# Patient Record
Sex: FEMALE | Race: WHITE | Employment: FULL TIME | URBAN - METROPOLITAN AREA
[De-identification: names, ages, dates, MRNs, and addresses within clinical notes are randomized per-mention and may not be internally consistent; named-entity substitution may affect disease eponyms.]

---

## 2017-02-21 ENCOUNTER — OFFICE VISIT (OUTPATIENT)
Dept: NEUROLOGY | Facility: CLINIC | Age: 57
End: 2017-02-21

## 2017-02-21 VITALS
RESPIRATION RATE: 18 BRPM | SYSTOLIC BLOOD PRESSURE: 142 MMHG | HEART RATE: 68 BPM | BODY MASS INDEX: 31 KG/M2 | DIASTOLIC BLOOD PRESSURE: 74 MMHG | WEIGHT: 182 LBS

## 2017-02-21 DIAGNOSIS — G44.039 EPISODIC PAROXYSMAL HEMICRANIA, NOT INTRACTABLE: Primary | ICD-10-CM

## 2017-02-21 DIAGNOSIS — G43.009 MIGRAINE WITHOUT AURA AND WITHOUT STATUS MIGRAINOSUS, NOT INTRACTABLE: ICD-10-CM

## 2017-02-21 DIAGNOSIS — Q28.3 CEREBRAL CAVERNOMA: ICD-10-CM

## 2017-02-21 PROCEDURE — 99204 OFFICE O/P NEW MOD 45 MIN: CPT | Performed by: OTHER

## 2017-02-21 RX ORDER — NADOLOL 20 MG/1
20 TABLET ORAL DAILY
Qty: 30 TABLET | Refills: 5 | Status: SHIPPED | OUTPATIENT
Start: 2017-02-21 | End: 2018-01-08

## 2017-02-21 RX ORDER — NARATRIPTAN 2.5 MG/1
2.5 TABLET ORAL AS NEEDED
Qty: 10 TABLET | Refills: 5 | Status: SHIPPED | OUTPATIENT
Start: 2017-02-21 | End: 2018-02-21

## 2017-02-21 NOTE — PROGRESS NOTES
[id number] [facility] with [facility]  2/21/2017    1:04 PM      Cc:  Abnormal MRI    HPI:   She was seen by a Neurologist in 2014.   She was having lightheadedness and a scan (CT) showed a malformation but not ca •  estradiol (CLIMARA) 0.05 MG/24HR Transdermal Patch Weekly, Place 1 patch onto the skin every 7 days. , Disp: 12 patch, Rfl: 4  •  Levothyroxine Sodium (SYNTHROID, LEVOTHROID) 88 MCG Oral Tab, Take 88 mcg by mouth before breakfast., Disp: , Rfl:   •  Prob Cerebellar, coordination and gait were normal      IMPRESSION  Episodic paroxysmal hemicrania, not intractable  (primary encounter diagnosis)  Migraine without aura and without status migrainosus, not intractable  Cerebral cavernoma      Nadolol 20 mg luis

## 2017-02-21 NOTE — PATIENT INSTRUCTIONS
Refill policies:    • Allow 2 business days for refills; controlled substances may take longer.   • Contact your pharmacy at least 5 days prior to running out of medication and have them send an electronic request or submit request through the “request re your physician has recommended that you have a procedure or additional testing performed. [facility] will contact your insurance carrier to obtain pre-certification or prior authorization.     Unfortunately, MARIEL has seen an increas

## 2017-02-22 ENCOUNTER — TELEPHONE (OUTPATIENT)
Dept: NEUROLOGY | Facility: CLINIC | Age: 57
End: 2017-02-22

## 2017-02-22 NOTE — TELEPHONE ENCOUNTER
Called [facility] spoke with Pratibha Campos no authorization is needed for MRI Brain [id number], call reference #[id number].  Time on call 4:53    Called patient gave her above information including centralized scheduling  phone #

## 2017-03-03 ENCOUNTER — HOSPITAL ENCOUNTER (OUTPATIENT)
Dept: MRI IMAGING | Facility: HOSPITAL | Age: 57
Discharge: HOME | End: 2017-03-03
Attending: Other
Payer: COMMERCIAL

## 2017-03-03 DIAGNOSIS — Q28.3 CEREBRAL CAVERNOMA: ICD-10-CM

## 2017-03-03 DIAGNOSIS — G43.009 MIGRAINE WITHOUT AURA AND WITHOUT STATUS MIGRAINOSUS, NOT INTRACTABLE: ICD-10-CM

## 2017-03-03 DIAGNOSIS — G44.039 EPISODIC PAROXYSMAL HEMICRANIA, NOT INTRACTABLE: ICD-10-CM

## 2017-03-03 PROCEDURE — A9575 INJ GADOTERATE MEGLUMI 0.1ML: HCPCS | Performed by: OTHER

## 2017-03-03 PROCEDURE — 70553 MRI BRAIN STEM W/O & W/DYE: CPT

## 2017-03-08 ENCOUNTER — TELEPHONE (OUTPATIENT)
Dept: NEUROLOGY | Facility: CLINIC | Age: 57
End: 2017-03-08

## 2017-03-08 NOTE — TELEPHONE ENCOUNTER
Called with result,  Vascular variation noted left thalamus prob incidental will talk to her about it next visit

## 2017-06-01 PROBLEM — I10 ESSENTIAL HYPERTENSION: Status: ACTIVE | Noted: 2017-06-01

## 2018-02-13 ENCOUNTER — HOSPITAL ENCOUNTER (OUTPATIENT)
Age: 58
Discharge: HOME | End: 2018-02-13
Payer: COMMERCIAL

## 2018-02-13 VITALS
OXYGEN SATURATION: 99 % | HEART RATE: 99 BPM | RESPIRATION RATE: 20 BRPM | DIASTOLIC BLOOD PRESSURE: 95 MMHG | TEMPERATURE: 99 F | SYSTOLIC BLOOD PRESSURE: 148 MMHG

## 2018-02-13 DIAGNOSIS — J01.00 ACUTE NON-RECURRENT MAXILLARY SINUSITIS: Primary | ICD-10-CM

## 2018-02-13 PROCEDURE — 99213 OFFICE O/P EST LOW 20 MIN: CPT

## 2018-02-13 PROCEDURE — 99203 OFFICE O/P NEW LOW 30 MIN: CPT

## 2018-02-13 RX ORDER — AMOXICILLIN AND CLAVULANATE POTASSIUM 875; 125 MG/1; MG/1
1 TABLET, FILM COATED ORAL 2 TIMES DAILY
Qty: 20 TABLET | Refills: 0 | Status: SHIPPED | OUTPATIENT
Start: 2018-02-13 | End: 2018-02-23

## 2018-02-13 NOTE — ED PROVIDER NOTES
[place] Seen [place] [address] [facility] [place]    History   Patient presents with:  Cough/URI    Stated Complaint: sinus pain, ear pain, congestion x 2 weeks    HPI    Simone Anderson is a 49-year-old female who presents today for evaluation of sinus pain, ear Triage Vitals [02/13/18 1611]  BP: 148/95  Pulse: 99  Resp: 20  Temp: 98.7 °F (37.1 °C)  Temp src: n/a  SpO2: 99 %  O2 Device: n/a    Current:/95   Pulse 99   Temp 98.7 °F (37.1 °C)   Resp 20   SpO2 99%     Physical Exam   Constitutional: She is orie upon discharge. I discuss the plan of care with the patient, who expresses understanding. All questions and concerns are addressed to the patient's satisfaction prior to discharge today.   Disposition and Plan     Clinical Impression:  Acute non-recurrent

## 2018-05-07 PROBLEM — K63.5 POLYP OF COLON, UNSPECIFIED PART OF COLON, UNSPECIFIED TYPE: Status: ACTIVE | Noted: 2018-05-07

## 2018-11-08 ENCOUNTER — LAB ENCOUNTER (OUTPATIENT)
Dept: LAB | Facility: HOSPITAL | Age: 58
End: 2018-11-08
Attending: FAMILY MEDICINE
Payer: COMMERCIAL

## 2018-11-08 DIAGNOSIS — R19.02 ABDOMINAL MASS, LEFT UPPER QUADRANT: Primary | ICD-10-CM

## 2018-11-08 PROCEDURE — 83690 ASSAY OF LIPASE: CPT

## 2018-11-08 PROCEDURE — 85025 COMPLETE CBC W/AUTO DIFF WBC: CPT

## 2018-11-08 PROCEDURE — 80048 BASIC METABOLIC PNL TOTAL CA: CPT

## 2018-11-08 PROCEDURE — 36415 COLL VENOUS BLD VENIPUNCTURE: CPT

## 2018-11-08 PROCEDURE — 82150 ASSAY OF AMYLASE: CPT

## 2019-02-11 PROCEDURE — 88305 TISSUE EXAM BY PATHOLOGIST: CPT | Performed by: INTERNAL MEDICINE

## 2023-01-16 ENCOUNTER — TELEPHONE (OUTPATIENT)
Facility: LOCATION | Age: 63
End: 2023-01-16

## 2023-01-16 NOTE — TELEPHONE ENCOUNTER
Patient would like a call back regarding her thyroid that was cancerous, states she has questions as her niece is having similar issues.

## 2023-04-17 ENCOUNTER — OFFICE VISIT (OUTPATIENT)
Facility: LOCATION | Age: 63
End: 2023-04-17
Payer: COMMERCIAL

## 2023-04-17 DIAGNOSIS — R05.3 CHRONIC COUGH: ICD-10-CM

## 2023-04-17 DIAGNOSIS — J30.89 NON-SEASONAL ALLERGIC RHINITIS, UNSPECIFIED TRIGGER: Primary | ICD-10-CM

## 2023-04-17 RX ORDER — AZELASTINE 1 MG/ML
2 SPRAY, METERED NASAL 2 TIMES DAILY
Qty: 30 ML | Refills: 3 | Status: SHIPPED | OUTPATIENT
Start: 2023-04-17

## 2024-12-10 ENCOUNTER — OFFICE VISIT (OUTPATIENT)
Facility: LOCATION | Age: 64
End: 2024-12-10
Payer: COMMERCIAL

## 2024-12-10 DIAGNOSIS — C73 PAPILLARY CARCINOMA OF THYROID (HCC): Primary | ICD-10-CM

## 2024-12-10 PROCEDURE — 99214 OFFICE O/P EST MOD 30 MIN: CPT | Performed by: OTOLARYNGOLOGY

## 2024-12-10 NOTE — PROGRESS NOTES
Barb Ac is a 63 year old female. No chief complaint on file.    HPI:   63-year-old white female seen her for the following issues.  #1 she had a lesion in the left nose that went away with treatment for rhinitis.  #2.  In 2012 patient had a total thyroidectomy performed by myself with postoperative I-131 radioactive Iodine treatment.  Primary care physician ordered thyroid ultrasound there is residual right thyroid and nonpathologic lymph nodes noted referred for evaluation.  Current Outpatient Medications   Medication Sig Dispense Refill    azelastine 0.1 % Nasal Solution 2 sprays by Nasal route 2 (two) times daily. 30 mL 3    Amlodipine Besy-Benazepril HCl 5-20 MG Oral Cap Take 1 capsule by mouth daily.      Levothyroxine Sodium 88 MCG Oral Tab Take 100 mcg by mouth daily.   30 tablet 6    Amlodipine-Valsartan-HCTZ 5-160-25 MG Oral Tab Take by mouth.      PROGESTERONE MICRONIZED 100 MG Oral Cap TAKE ONE CAPSULE BY MOUTH NIGHTLY 90 capsule 0    Estradiol 0.05 MG/24HR Transdermal Patch Biweekly Place 1 patch onto the skin twice a week. 12 patch 0    AMLODIPINE BESYLATE 5 MG Oral Tab TAKE 1 TABLET ONCE DAILY 90 tablet 0    atorvastatin 10 MG Oral Tab Take 1 tablet (10 mg total) by mouth nightly. 30 tablet 2    RESTASIS 0.05 % Ophthalmic Emulsion       Olopatadine HCl 0.2 % Ophthalmic Solution       Omeprazole 40 MG Oral Capsule Delayed Release Take 1 capsule (40 mg total) by mouth once daily. 90 capsule 3    AmLODIPine Besylate 5 MG Oral Tab TAKE 1 TABLET ONCE DAILY 90 tablet 1    Fexofenadine HCl (ALLEGRA) 180 MG Oral Tab Take 1 tablet (180 mg total) by mouth daily. 1 daily (takes 2 tabs daily prn when allergies flare). 90 tablet 3    estradiol 0.05 MG/24HR Transdermal Patch Weekly Place 1 patch onto the skin once a week. 12 patch 0    Probiotic Product (PROBIOTIC OR) Take  by mouth.        Past Medical History:    Dysplastic nevus    R shoulder    Hiatal hernia    Hypertension    Left adrenal adenoma    no  change mri , (-) dheas, cortisol, carlin    Recurrent UTI    Thyroid cancer (HCC)    s/p resection, smith      Social History:  Social History     Socioeconomic History    Marital status:     Number of children: 2   Occupational History    Occupation: [employer]   Tobacco Use    Smoking status: Former     Current packs/day: 0.00     Average packs/day: 1 pack/day for 30.0 years (30.0 ttl pk-yrs)     Types: Cigarettes     Start date: 1981     Quit date: 2011     Years since quittin.9    Smokeless tobacco: Never    Tobacco comments:     started    Substance and Sexual Activity    Alcohol use: No     Alcohol/week: 0.0 standard drinks of alcohol    Drug use: No    Sexual activity: Yes     Partners: Male     Social Drivers of Health      Received from [facility] [place] [place], [facility] [place]    Social Connections    Received from [facility] [place], [facility] [place]    Housing Stability      Past Surgical History:   Procedure Laterality Date    Benign biopsy left      Colonoscopy N/A 2019    Procedure: COLONOSCOPY, POSSIBLE BIOPSY, POSSIBLE POLYPECTOMY 50516;  Surgeon: Alexandro Najera MD;  Location: [facility]    Colonoscopy,biopsy N/A 2015    Colonoscopy,remv lesn,snare N/A 2015    diverticulosis, adenomatous polyp    Cystoscopy,+ureteroscopy  2014    Thyroidectomy           REVIEW OF SYSTEMS:   GENERAL HEALTH: feels well otherwise  GENERAL : denies fever, chills, sweats, weight loss, weight gain  SKIN: denies any unusual skin lesions or rashes  RESPIRATORY: denies shortness of breath with exertion  NEURO: denies headaches    EXAM:   [facility] 2014     System Pertinent findings Details   Constitutional  Overall appearance - Normal.   Head/Face  Facial features -- Normal. Skull - Normal.   Eyes  Pupils equal ,round ,react to light and accomidate   Ears  External Ear Right: Normal, Left: Normal. Canal - Right:  Normal, Left: Normal. TM - Right: Normal left: Normal   Nose  External Nose, Normal, Septum -normal midline lesion gone previously same,Nasal Vault, clear. Turbinates - Right: Normal left: Normal   Mouth/Throat  Lips/teeth/gums - Normal. Tonsils -1+ oropharynx - Normal.   Neck Exam Status post thyroidectomy no masses no cervical lymphadenopathy palpable Inspection - Normal. Palpation - Normal. Parotid gland - Normal. Thyroid gland -normal no masses lesions no cervical lymphadenopathy   Lymph Detail  Submental. Submandibular. Anterior cervical. Posterior cervical. Supraclavicular.   Thyroid ultrasound from [facility] 9/20/2024  Patient had thyroidectomy for thyroid cancer 2012 6 mm residual thyroid on the right nonspecific lymph nodes.      ASSESSMENT AND PLAN:   1. Papillary carcinoma of thyroid (HCC)  No evidence of disease lymph node superior to be benign recommending 8-month follow-up thyroid ultrasound with return to office patient will get ultrasound at [facility] facility      The patient indicates understanding of these issues and agrees to the plan.      Naren Pineda MD  12/10/2024  10:00 AM

## 2025-04-30 ENCOUNTER — TELEPHONE (OUTPATIENT)
Dept: NEUROLOGY | Facility: CLINIC | Age: 65
End: 2025-04-30

## 2025-04-30 NOTE — TELEPHONE ENCOUNTER
Patient calling asking if we can mailed out her MRI report from 03/03/2017.  Report mailed to her home

## 2025-05-30 ENCOUNTER — TELEPHONE (OUTPATIENT)
Dept: SURGERY | Facility: CLINIC | Age: 65
End: 2025-05-30

## 2025-05-30 NOTE — TELEPHONE ENCOUNTER
Imaging disc received in clinic of MRI Brain W W/O Contrast, dos 2/27/25, completed at [facility].   Imaging uploaded through PACS and viewable through [employer] successfully. Imaging report endorsed to nurses bin for provider review. Disc and report placed in check-out drawer and to be returned to patient at end of their OV.

## 2025-06-04 ENCOUNTER — MED REC SCAN ONLY (OUTPATIENT)
Dept: SURGERY | Facility: CLINIC | Age: 65
End: 2025-06-04

## (undated) NOTE — MR AVS SNAPSHOT
[facility] [address] \[facility]\"" [id number] [phone]               Thank you for choosing us for your health care visit with Samuel Edwards MD.  We are glad to serve you and happy to provide you with this summary of your v and have them send an electronic request or submit request through the “request refill” option in your [employer] account. ? Refills are not addressed on weekends; covering physicians do not authorize routine medications on weekends.   ? No narcotics or contr insurance carrier to obtain pre-certification or prior authorization. Unfortunately, MARIEL has seen an increase in denial of payment even though the procedure/test has been pre-certified.   You are strongly encouraged to contact your insurance carrier to v Omeprazole 40 MG Cpdr   TAKE 1 CAPSULE ONCE DAILY           PROBIOTIC OR   Take  by mouth. Progesterone Micronized 100 MG Caps   Take 1 capsule (100 mg total) by mouth daily. What changed:  Another medication with the same name was removed.  Co